# Patient Record
Sex: MALE | Race: WHITE | NOT HISPANIC OR LATINO | Employment: OTHER | URBAN - METROPOLITAN AREA
[De-identification: names, ages, dates, MRNs, and addresses within clinical notes are randomized per-mention and may not be internally consistent; named-entity substitution may affect disease eponyms.]

---

## 2024-06-24 ENCOUNTER — OFFICE VISIT (OUTPATIENT)
Dept: ORTHOPEDIC SURGERY | Facility: CLINIC | Age: 59
End: 2024-06-24
Payer: COMMERCIAL

## 2024-06-24 DIAGNOSIS — M70.21 OLECRANON BURSITIS OF RIGHT ELBOW: Primary | ICD-10-CM

## 2024-06-24 PROCEDURE — 99203 OFFICE O/P NEW LOW 30 MIN: CPT | Performed by: INTERNAL MEDICINE

## 2024-06-24 PROCEDURE — L3670 SO ACRO/CLAV CAN WEB PRE OTS: HCPCS | Performed by: INTERNAL MEDICINE

## 2024-06-24 PROCEDURE — 99213 OFFICE O/P EST LOW 20 MIN: CPT | Performed by: INTERNAL MEDICINE

## 2024-06-24 NOTE — PROGRESS NOTES
Acute Injury New Patient Visit    CC:   Chief Complaint   Patient presents with    Right Elbow - Pain     Rt elbow bursitis  Xrays at  6.20       HPI: Giorgio is a 58 y.o. male presents today for right elbow pain and swelling which started last week. He went to the urgent care where x-rays were taken. He is here for initial evaluation.        Review of Systems   GENERAL: Negative for malaise, significant weight loss, fever  MUSCULOSKELETAL: See HPI  NEURO:  Negative for numbness / tingling     Past Medical History  Past Medical History:   Diagnosis Date    Radiculopathy, cervical region     Cervical radiculitis       Medication review  Medication Documentation Review Audit       Reviewed by Breanna Yang MA (Technologist) on 06/24/24 at 0835      Medication Order Taking? Sig Documenting Provider Last Dose Status            No Medications to Display                                   Allergies  Not on File    Social History  Social History     Socioeconomic History    Marital status:      Spouse name: Not on file    Number of children: Not on file    Years of education: Not on file    Highest education level: Not on file   Occupational History    Not on file   Tobacco Use    Smoking status: Some Days     Types: Cigarettes    Smokeless tobacco: Not on file   Substance and Sexual Activity    Alcohol use: Not on file    Drug use: Not on file    Sexual activity: Not on file   Other Topics Concern    Not on file   Social History Narrative    Not on file     Social Determinants of Health     Financial Resource Strain: Not on file   Food Insecurity: Not on file   Transportation Needs: Not on file   Physical Activity: Not on file   Stress: Not on file   Social Connections: Not on file   Intimate Partner Violence: Not on file   Housing Stability: Not on file       Surgical History  History reviewed. No pertinent surgical history.    Physical Exam:  GENERAL:  Patient is awake, alert, and oriented to person place and  time.  Patient appears well nourished and well kept.  Affect Calm, Not Acutely Distressed.  HEENT:  Normocephalic, Atraumatic, EOMI  CARDIOVASCULAR:  Hemodynamically stable.  RESPIRATORY:  Normal respirations with unlabored breathing.  Extremity: Right elbow shows obvious olecranon bursal swelling.  There is no erythema or warmth.  No erythema tracking.  No pain over olecranon process.  No clinical signs infection.  He can flex right elbow to 130 degrees.  Full extension is 0 degrees.  Can pronate and supinate with no pain discomfort.  Distal biceps tendon intact.  Distal triceps intact.  Satisfactory capillary refill time.  Distal pulses are palpable.  Left elbow was examined for comparison.      Diagnostics: X-rays reviewed      Procedure: None    Assessment: Right olecranon bursitis    Plan: Giorgio presents today for initial evaluation for acute right elbow pain which started last week. X-rays showed obvious fractures, there are no clinical signs infection.  We placed him into a sling for comfort and he will continue with Medrol dose Goyo for acute inflammation. He will follow-up on Friday for reevaluation. If no improvement, may consider possible aspiration.     No orders of the defined types were placed in this encounter.     At the conclusion of the visit there were no further questions by the patient/family regarding their plan of care.  Patient was instructed to call or return with any issues, questions, or concerns regarding their injury and/or treatment plan described above.     06/24/24 at 8:50 AM - Ariel Khoury MD  Scribe Attestation  By signing my name below, I Taran Casillasmo, Scribedvin   attest that this documentation has been prepared under the direction and in the presence of Ariel Khoury MD.    Office: [phone]    This note was prepared using voice recognition software.  The details of this note are correct and have been reviewed, and corrected to the best of my ability.  Some grammatical  errors may persist related to the Dragon software.

## 2024-06-28 ENCOUNTER — OFFICE VISIT (OUTPATIENT)
Dept: ORTHOPEDIC SURGERY | Facility: CLINIC | Age: 59
End: 2024-06-28
Payer: COMMERCIAL

## 2024-06-28 DIAGNOSIS — M70.21 OLECRANON BURSITIS OF RIGHT ELBOW: Primary | ICD-10-CM

## 2024-06-28 PROCEDURE — 99213 OFFICE O/P EST LOW 20 MIN: CPT | Performed by: INTERNAL MEDICINE

## 2024-06-28 ASSESSMENT — PAIN - FUNCTIONAL ASSESSMENT: PAIN_FUNCTIONAL_ASSESSMENT: NO/DENIES PAIN

## 2024-06-28 NOTE — PROGRESS NOTES
CC:   Chief Complaint   Patient presents with    Right Elbow - Follow-up     Olecranon bursitis  Re evaluate today       HPI: Giorgio is a 58 y.o. male presents today for reevaluation for right olecranon bursitis. He notes that the swelling has slightly subsided. He states that he has a project he will be working on over the weekend into next week.  He denies any pain in the right elbow.        Review of Systems   GENERAL: Negative for malaise, significant weight loss, fever  MUSCULOSKELETAL: See HPI  NEURO:  Negative for numbness / tingling     Past Medical History  Past Medical History:   Diagnosis Date    Radiculopathy, cervical region     Cervical radiculitis       Medication review  Medication Documentation Review Audit       Reviewed by Breanna Yang MA (Technologist) on 06/24/24 at 0835      Medication Order Taking? Sig Documenting Provider Last Dose Status            No Medications to Display                                   Allergies  Not on File    Social History  Social History     Socioeconomic History    Marital status:      Spouse name: Not on file    Number of children: Not on file    Years of education: Not on file    Highest education level: Not on file   Occupational History    Not on file   Tobacco Use    Smoking status: Some Days     Types: Cigarettes    Smokeless tobacco: Not on file   Substance and Sexual Activity    Alcohol use: Not on file    Drug use: Not on file    Sexual activity: Not on file   Other Topics Concern    Not on file   Social History Narrative    Not on file     Social Determinants of Health     Financial Resource Strain: Not on file   Food Insecurity: Not on file   Transportation Needs: Not on file   Physical Activity: Not on file   Stress: Not on file   Social Connections: Not on file   Intimate Partner Violence: Not on file   Housing Stability: Not on file       Surgical History  No past surgical history on file.    Physical Exam:  GENERAL:  Patient is awake,  alert, and oriented to person place and time.  Patient appears well nourished and well kept.  Affect Calm, Not Acutely Distressed.  HEENT:  Normocephalic, Atraumatic, EOMI  CARDIOVASCULAR:  Hemodynamically stable.  RESPIRATORY:  Normal respirations with unlabored breathing.  Extremity: Right elbow shows obvious olecranon bursal swelling.  There is no erythema or warmth.  No erythema tracking.  No pain over olecranon process.  No clinical signs infection.  He can flex right elbow to 130 degrees.  Full extension is 0 degrees.  Can pronate and supinate with no pain discomfort.  Distal biceps tendon intact.  Distal triceps intact.  Satisfactory capillary refill time.  Distal pulses are palpable.  Left elbow was examined for comparison.       Diagnostics: None today        Procedure: None    Assessment: Right olecranon bursitis     Plan: Giorgio presents today for reevaluation for right olecranon bursitis. He is clinically doing better, slightly subsided swelling.  He denies any pain.  We did discuss aspiration olecranon bursa today, however he says he has a lot of projects, and is unable to rest his elbow for the next several days, will does avoid an aspiration today.  Recommended ice, anti-inflammatories and compression sleeve.  He will call the office if he wants to go forward with a aspiration of the olecranon bursitis.    No orders of the defined types were placed in this encounter.     At the conclusion of the visit there were no further questions by the patient/family regarding their plan of care.  Patient was instructed to call or return with any issues, questions, or concerns regarding their injury and/or treatment plan described above.     06/28/24 at 9:21 AM - Ariel Khoury MD  Scribe Attestation  By signing my name below, I Taran Buster, Scribedvin   attest that this documentation has been prepared under the direction and in the presence of Ariel Khoury MD.    Office: [phone]    This note was  prepared using voice recognition software.  The details of this note are correct and have been reviewed, and corrected to the best of my ability.  Some grammatical errors may persist related to the Dragon software.